# Patient Record
Sex: FEMALE | Race: OTHER | NOT HISPANIC OR LATINO | URBAN - METROPOLITAN AREA
[De-identification: names, ages, dates, MRNs, and addresses within clinical notes are randomized per-mention and may not be internally consistent; named-entity substitution may affect disease eponyms.]

---

## 2018-04-21 ENCOUNTER — OUTPATIENT (OUTPATIENT)
Dept: OUTPATIENT SERVICES | Facility: HOSPITAL | Age: 28
LOS: 1 days | End: 2018-04-21

## 2018-04-21 VITALS
SYSTOLIC BLOOD PRESSURE: 110 MMHG | OXYGEN SATURATION: 99 % | WEIGHT: 171.96 LBS | RESPIRATION RATE: 16 BRPM | DIASTOLIC BLOOD PRESSURE: 74 MMHG | HEART RATE: 68 BPM | TEMPERATURE: 98 F | HEIGHT: 64 IN

## 2018-04-21 DIAGNOSIS — D17.1 BENIGN LIPOMATOUS NEOPLASM OF SKIN AND SUBCUTANEOUS TISSUE OF TRUNK: ICD-10-CM

## 2018-04-21 LAB
HCT VFR BLD CALC: 44.1 % — SIGNIFICANT CHANGE UP (ref 34.5–45)
HGB BLD-MCNC: 14.4 G/DL — SIGNIFICANT CHANGE UP (ref 11.5–15.5)
MCHC RBC-ENTMCNC: 28.6 PG — SIGNIFICANT CHANGE UP (ref 27–34)
MCHC RBC-ENTMCNC: 32.7 % — SIGNIFICANT CHANGE UP (ref 32–36)
MCV RBC AUTO: 87.7 FL — SIGNIFICANT CHANGE UP (ref 80–100)
NRBC # FLD: 0 — SIGNIFICANT CHANGE UP
PLATELET # BLD AUTO: 238 K/UL — SIGNIFICANT CHANGE UP (ref 150–400)
PMV BLD: 11.8 FL — SIGNIFICANT CHANGE UP (ref 7–13)
RBC # BLD: 5.03 M/UL — SIGNIFICANT CHANGE UP (ref 3.8–5.2)
RBC # FLD: 13 % — SIGNIFICANT CHANGE UP (ref 10.3–14.5)
WBC # BLD: 12.59 K/UL — HIGH (ref 3.8–10.5)
WBC # FLD AUTO: 12.59 K/UL — HIGH (ref 3.8–10.5)

## 2018-04-21 NOTE — H&P PST ADULT - NSANTHOSAYNRD_GEN_A_CORE
No. MADDIE screening performed.  STOP BANG Legend: 0-2 = LOW Risk; 3-4 = INTERMEDIATE Risk; 5-8 = HIGH Risk

## 2018-04-21 NOTE — H&P PST ADULT - PROBLEM SELECTOR PLAN 1
Pt. is scheduled for excision of lipoma of left upper back on 4/24/18.  Preoperative instructions reviewed, pt verbalized understanding.  Preop Famotidine provided.  Lab results pending

## 2018-04-21 NOTE — H&P PST ADULT - HISTORY OF PRESENT ILLNESS
28 y/o 28 y/o female with history of a lipoma presents to [facility] today for presurgical evaluation.  She noticed the lipoma approximately 2 years ago and complains that it continues to grow in size.  She is scheduled for excision of lipoma of left upper back on 4/24/18.

## 2018-04-24 ENCOUNTER — OUTPATIENT (OUTPATIENT)
Dept: OUTPATIENT SERVICES | Facility: HOSPITAL | Age: 28
LOS: 1 days | Discharge: HOME | End: 2018-04-24
Payer: COMMERCIAL

## 2018-04-24 VITALS
HEIGHT: 64 IN | HEART RATE: 96 BPM | DIASTOLIC BLOOD PRESSURE: 71 MMHG | OXYGEN SATURATION: 100 % | WEIGHT: 171.96 LBS | SYSTOLIC BLOOD PRESSURE: 135 MMHG | TEMPERATURE: 98 F | RESPIRATION RATE: 16 BRPM

## 2018-04-24 VITALS
DIASTOLIC BLOOD PRESSURE: 85 MMHG | SYSTOLIC BLOOD PRESSURE: 128 MMHG | TEMPERATURE: 98 F | OXYGEN SATURATION: 100 % | HEART RATE: 107 BPM | RESPIRATION RATE: 15 BRPM

## 2018-04-24 DIAGNOSIS — D17.1 BENIGN LIPOMATOUS NEOPLASM OF SKIN AND SUBCUTANEOUS TISSUE OF TRUNK: ICD-10-CM

## 2018-04-24 PROCEDURE — 88304 TISSUE EXAM BY PATHOLOGIST: CPT | Mod: 26

## 2018-04-24 RX ORDER — ASCORBIC ACID 60 MG
1 TABLET,CHEWABLE ORAL
Qty: 0 | Refills: 0 | COMMUNITY

## 2018-04-24 RX ORDER — SODIUM CHLORIDE 9 MG/ML
1000 INJECTION, SOLUTION INTRAVENOUS
Qty: 0 | Refills: 0 | Status: DISCONTINUED | OUTPATIENT
Start: 2018-04-24 | End: 2018-04-25

## 2018-04-24 RX ORDER — CETIRIZINE HYDROCHLORIDE 10 MG/1
1 TABLET ORAL
Qty: 0 | Refills: 0 | COMMUNITY

## 2018-04-24 NOTE — ASU DISCHARGE PLAN (ADULT/PEDIATRIC). - MEDICATION SUMMARY - MEDICATIONS TO TAKE
I will START or STAY ON the medications listed below when I get home from the hospital:    ZyrTEC 10 mg oral tablet  -- 1 tab(s) by mouth once a day in am  -- Indication: For Allergies    Vitamin C 1000 mg oral tablet  -- 1 tab(s) by mouth prn  -- Indication: For Nutritional

## 2018-04-24 NOTE — ASU DISCHARGE PLAN (ADULT/PEDIATRIC). - NOTIFY
Fever greater than 101/Persistent Nausea and Vomiting/Pain not relieved by Medications/Bleeding that does not stop Fever greater than 101/Numbness, color, or temperature change to extremity/Pain not relieved by Medications/Persistent Nausea and Vomiting/Bleeding that does not stop

## 2018-04-24 NOTE — ASU DISCHARGE PLAN (ADULT/PEDIATRIC). - NURSING INSTRUCTIONS
You were given IV Tylenol for pain management in the Operating Room.  Please do NOT take tylenol/acetaminophen products for the next 6-8 hours (after  945PM ).  You were given Toradol for pain management in the Operating Room. Please do NOT take Ibuprofen (NSAIDS) products (Motrin, Aleve, Advil) for the next 6-8 hours (After  945PM).  Refer to medication reconcillation sheet attached with discharge instruction paperwork.

## 2018-04-24 NOTE — ASU DISCHARGE PLAN (ADULT/PEDIATRIC). - SPECIAL INSTRUCTIONS
Please take OTC Motrin and Tylenol for pain as needed. You may alternate these medicines if necessary or choose one.

## 2018-04-24 NOTE — ASU DISCHARGE PLAN (ADULT/PEDIATRIC). - ACTIVITY LEVEL
no heavy lifting/no tub baths no tub baths/no exercise/no weight bearing/no heavy lifting/no sports/gym

## 2018-04-27 LAB — SURGICAL PATHOLOGY STUDY: SIGNIFICANT CHANGE UP

## 2019-04-08 NOTE — H&P PST ADULT - SPIRITUAL CULTURAL, RELIGIOUS PRACTICES/VALUES, PROFILE
[FreeTextEntry1] : Exam limited to chest:  well healed thoracotomy scar.  inspiratory stridor with deep breathing. none

## 2021-06-12 NOTE — H&P PST ADULT - ATTENDING PHYSICIAN (PRINT):______________________________ DATE:_______ TIME:_______

## 2024-03-18 NOTE — H&P PST ADULT - LIVES WITH, PROFILE
Continued Stay SW/CM Assessment/Plan of Care Note       Active Substitute Decision Maker (SDM)       JESSENIA DE SANTIAGO [facility] Agent 1 - Daughter   Primary Phone: [phone] (Mobile)  Home Phone: [phone]  Work Phone: [phone]                     Progress note:  Chart reviewed and pt discussed in OFT's. Aware pt continuing on IV abx, receiving HD while at [facility], and pt scheduled daily with wound care. Pt not medically stable to discharge at this time due to pending antibiotic plan.    PT/OT signing off due to no acute goals due to pt's wounds and being total assist at baseline.     MITRA received a VM from [facility] RN asking for a call back. When MITRA attempted to return call, received message that the phone number dialed was not in service. MITRA sent another e-mail to pt's [facility] , Elidia with update and asking her to pass along to team.     Addendum 1345:  MITRA received secure chat from MD stating abx plan in ID note from 3/18/2024. SW aware RN CM following up on coordinating this. MITRA e-mailed caregivers to see if abx can be given in home and staff confirmed they are able.     1502:  MITRA attempted to meet bedside with pt but pt had blanket over his head and did not answer when SW called his name twice. MITRA could tell pt's chest moving/pt sleeping and left pt to rest.    SW will continue to follow and assist with discharge needs.     See SW/CM flowsheets for other objective data.    Disposition Recommendations:  Preliminary discharge destination: Planned Discharge Destination: longterm  SW/CM recommendation for discharge: [facility]    Destination Pharmacy:        Discharge Plan/Needs:     Continued Care and Services - Admitted Since 3/8/2024    Coordination has not been started for this encounter.       Continued Care and Services - Linked Episodes Includes continued care and service providers from the active episodes linked to this encounter, which are listed  below      Care Transitions Episode start date: 3/11/2024   There are no active outsourced providers for this episode.                 Prior To Hospitalization:    Living Situation: Other facility residents and residing at [facility]    .  Support Systems: Friends   Home Devices/Equipment: Blood pressure monitor, Mobility assist device            Mobility Assist Devices: Wheelchair   Type of Service Prior to Hospitalization:  ( through [facility])               Patient/Family discharge goal (s):  [facility]     Resources provided:           Therapy Recommendations for Discharge:   PT:      Last Filed Values       None          OT:       Last Filed Values       None          SLP:    Last Filed Values       None            Mobility Equipment Recommended for Discharge:        Barriers to Discharge  Identified Barriers to Discharge/Transition Planning: Medical necessity for acute care               other relative/Aunt/Uncle